# Patient Record
Sex: FEMALE | Race: WHITE | Employment: FULL TIME | URBAN - METROPOLITAN AREA
[De-identification: names, ages, dates, MRNs, and addresses within clinical notes are randomized per-mention and may not be internally consistent; named-entity substitution may affect disease eponyms.]

---

## 2018-03-29 ENCOUNTER — OFFICE VISIT (OUTPATIENT)
Dept: INFECTIOUS DISEASES | Age: 23
End: 2018-03-29

## 2018-03-29 VITALS
WEIGHT: 142 LBS | DIASTOLIC BLOOD PRESSURE: 66 MMHG | HEIGHT: 63 IN | TEMPERATURE: 98.4 F | BODY MASS INDEX: 25.16 KG/M2 | SYSTOLIC BLOOD PRESSURE: 104 MMHG | OXYGEN SATURATION: 99 % | HEART RATE: 94 BPM

## 2018-03-29 DIAGNOSIS — Z11.4 SCREENING FOR HIV (HUMAN IMMUNODEFICIENCY VIRUS): Primary | ICD-10-CM

## 2018-03-29 DIAGNOSIS — Z11.1 SCREENING-PULMONARY TB: ICD-10-CM

## 2018-03-29 DIAGNOSIS — Z70.8 SEXUALLY TRANSMITTED DISEASE COUNSELING: ICD-10-CM

## 2018-03-29 DIAGNOSIS — Z11.4 SCREENING FOR HIV (HUMAN IMMUNODEFICIENCY VIRUS): ICD-10-CM

## 2018-03-29 PROCEDURE — 99204 OFFICE O/P NEW MOD 45 MIN: CPT | Performed by: INTERNAL MEDICINE

## 2018-03-29 ASSESSMENT — ENCOUNTER SYMPTOMS
BACK PAIN: 0
COUGH: 0
HEMOPTYSIS: 0
EYE REDNESS: 0
BLURRED VISION: 0
DIARRHEA: 0
WHEEZING: 0
EYE DISCHARGE: 0
SPUTUM PRODUCTION: 0
SHORTNESS OF BREATH: 0
CONSTIPATION: 0
SORE THROAT: 0
NAUSEA: 0
ABDOMINAL PAIN: 0
DOUBLE VISION: 0

## 2018-03-29 NOTE — PROGRESS NOTES
Infectious Diseases Clinic New Patient Note      Reason for Visit:                Positive human immunodeficiency virus test  Primary Care Physician:  Joni Mcdonnell MD  History Obtained From:   Patient , Medical Records     CHIEF COMPLAINT:    Chief Complaint   Patient presents with    New Patient       HISTORY OF PRESENT ILLNESS: Bhavesh Pacheco is a 20 y.o. female patient, who was seen today in ID clinic at the request of Dr. Joni Mcdonnell MD    History was obtained from chart review and the patient. The patient was seen today for above mentioned complaints. The patient comes in today because she had a positive human immunodeficiency virus test done at [facility]. [facility] to get routine testing for STDs as she was starting a new relationship. She had human immunodeficiency virus 1 and 2 antibody screening done which was negative. That was reflexed to human immunodeficiency virus fourth-generation test that came back positive. The patient was referred to my clinic. She is asymptomatic. She is very stressed today about her new diagnosis. She also is asking that why her HIV 1 and 2 antibody were negative. She wants to establish care. Past Medical History:   Past medical  history was reviewed by me during this visit in detail. No past medical history on file. Past Surgical History:  Past surgical history was reviewed by me during this visit in detail. Past Surgical History:   Procedure Laterality Date    APPENDECTOMY  9/6/12    laparoscopic       Current Medications: All medications were reviewed by me during this visit  Current Outpatient Prescriptions   Medication Sig Dispense Refill    albuterol (PROAIR HFA) 108 (90 BASE) MCG/ACT inhaler Inhale 2 puffs into the lungs every 6 hours as needed for Shortness of Breath. Dispense with SPACER and Instruct on use 1 Inhaler 1     No current facility-administered medications for this visit.             Family

## 2018-03-30 LAB
A/G RATIO: 2 (ref 1.1–2.2)
ALBUMIN SERPL-MCNC: 5.3 G/DL (ref 3.4–5)
ALP BLD-CCNC: 61 U/L (ref 40–129)
ALT SERPL-CCNC: 20 U/L (ref 10–40)
ANION GAP SERPL CALCULATED.3IONS-SCNC: 16 MMOL/L (ref 3–16)
AST SERPL-CCNC: 18 U/L (ref 15–37)
BASOPHILS ABSOLUTE: 0 K/UL (ref 0–0.2)
BASOPHILS RELATIVE PERCENT: 0.5 %
BILIRUB SERPL-MCNC: 0.4 MG/DL (ref 0–1)
BUN BLDV-MCNC: 10 MG/DL (ref 7–20)
CALCIUM SERPL-MCNC: 9.4 MG/DL (ref 8.3–10.6)
CHLORIDE BLD-SCNC: 103 MMOL/L (ref 99–110)
CO2: 25 MMOL/L (ref 21–32)
CREAT SERPL-MCNC: 0.8 MG/DL (ref 0.6–1.1)
EOSINOPHILS ABSOLUTE: 0.1 K/UL (ref 0–0.6)
EOSINOPHILS RELATIVE PERCENT: 1.6 %
GFR AFRICAN AMERICAN: >60
GFR NON-AFRICAN AMERICAN: >60
GLOBULIN: 2.6 G/DL
GLUCOSE BLD-MCNC: 88 MG/DL (ref 70–99)
HCT VFR BLD CALC: 43.5 % (ref 36–48)
HEMOGLOBIN: 15.1 G/DL (ref 12–16)
LYMPHOCYTES ABSOLUTE: 2.2 K/UL (ref 1–5.1)
LYMPHOCYTES RELATIVE PERCENT: 31.4 %
MCH RBC QN AUTO: 30.6 PG (ref 26–34)
MCHC RBC AUTO-ENTMCNC: 34.8 G/DL (ref 31–36)
MCV RBC AUTO: 88.1 FL (ref 80–100)
MONOCYTES ABSOLUTE: 0.4 K/UL (ref 0–1.3)
MONOCYTES RELATIVE PERCENT: 5.8 %
NEUTROPHILS ABSOLUTE: 4.3 K/UL (ref 1.7–7.7)
NEUTROPHILS RELATIVE PERCENT: 60.7 %
PDW BLD-RTO: 12 % (ref 12.4–15.4)
PLATELET # BLD: 186 K/UL (ref 135–450)
PMV BLD AUTO: 10.2 FL (ref 5–10.5)
POTASSIUM SERPL-SCNC: 3.9 MMOL/L (ref 3.5–5.1)
RBC # BLD: 4.94 M/UL (ref 4–5.2)
SODIUM BLD-SCNC: 144 MMOL/L (ref 136–145)
TOTAL PROTEIN: 7.9 G/DL (ref 6.4–8.2)
WBC # BLD: 7 K/UL (ref 4–11)

## 2018-04-02 LAB
HIV RNA PCR INTERPRETATION: NOT DETECTED
HIV-1 RNA BY PCR, QN: <1.3 LOG
HIV-1 RNA BY PCR, QN: <20 CPY/ML
QUANTIFERON (R) TB GOLD (INCUBATED): NEGATIVE
QUANTIFERON MITOGEN: >10 IU/ML
QUANTIFERON NIL: 0.08 IU/ML
QUANTIFERON TB AG MINUS NIL: 0 IU/ML (ref 0–0.34)

## 2018-04-05 LAB
EER HIV-1 GENOTYPE BY SEQUENCE: NORMAL
HIV-1 GENOTYPE: NORMAL
HIV-1 INTEGRASE INHIBITOR RESISTANCE, SEQUENCE: NORMAL
HLA-B*5701 GENOTYPING: NEGATIVE
HLA-B*5701 SPECIMEN: NORMAL

## 2018-04-06 LAB
HIV AG/AB: REACTIVE
HIV ANTIGEN: REACTIVE
HIV-1 ANTIBODY: REACTIVE
HIV-2 AB: REACTIVE

## 2018-04-09 ENCOUNTER — TELEPHONE (OUTPATIENT)
Dept: INFECTIOUS DISEASES | Age: 23
End: 2018-04-09

## 2018-04-10 LAB
HIV 1/2 AB DIFF IMMUNOASSAY: NORMAL
HIV INTERPRETATION: NORMAL
HIV-1 ANTIBODY, SUPPLEMENTAL: NEGATIVE
HIV-2 ANTIBODY, SUPPLEMENTAL: NEGATIVE

## 2022-02-06 ENCOUNTER — HOSPITAL ENCOUNTER (EMERGENCY)
Age: 27
Discharge: HOME | End: 2022-02-06
Payer: COMMERCIAL

## 2022-02-06 VITALS
RESPIRATION RATE: 16 BRPM | SYSTOLIC BLOOD PRESSURE: 133 MMHG | HEART RATE: 97 BPM | OXYGEN SATURATION: 100 % | TEMPERATURE: 97.5 F | DIASTOLIC BLOOD PRESSURE: 87 MMHG

## 2022-02-06 DIAGNOSIS — S61.012A LACERATION OF LEFT THUMB WITHOUT FOREIGN BODY WITHOUT DAMAGE TO NAIL, INITIAL ENCOUNTER: ICD-10-CM

## 2022-02-06 PROCEDURE — 99283 EMERGENCY DEPT VISIT LOW MDM: CPT

## 2022-02-06 PROCEDURE — 12002 RPR S/N/AX/GEN/TRNK2.6-7.5CM: CPT

## 2022-02-06 ASSESSMENT — ENCOUNTER SYMPTOMS
VOMITING: 0
NAUSEA: 0

## 2022-02-06 ASSESSMENT — PAIN SCALES - GENERAL: PAINLEVEL_OUTOF10: 9

## 2022-02-06 NOTE — ED PROVIDER NOTES
[address] [facility]        Pt Name: Caron Aponte  MRN: [id number]  [place] 1995  Date of evaluation: 2/6/2022  Provider: Uyen Squires PA-C  PCP: Gretchen Pandya MD  Note Started: 2:59 AM EST       ROSA. I have evaluated this patient. My supervising physician was available for consultation. CHIEF COMPLAINT       Chief Complaint   Patient presents with    Laceration     pt was cutting vegetables and cut thumb with knife. HISTORY OF PRESENT ILLNESS   (Location, Timing/Onset, Context/Setting, Quality, Duration, Modifying Factors, Severity, Associated Signs and Symptoms)  Note limiting factors. Chief Complaint: Left thumb laceration    Caron Aponte is a 27 y.o. female who presents to the emergency department today for evaluation for a laceration to her left thumb which occurred shortly before arriving to the ED. The patient states that she was cutting up vegetables, with a new knife, which was straight, and she states that she accidentally cut her left thumb. The patient is unsure of her last tetanus. She is complaining of pain to where the laceration is only, and she is rating her discomfort as a 9/10. The patient is right-handed. She has no numbness, tingling or weakness. No fever chills. No nausea or vomiting. She is able to move the thumb without any difficulty. Nursing Notes were all reviewed and agreed with or any disagreements were addressed in the HPI. REVIEW OF SYSTEMS    (2-9 systems for level 4, 10 or more for level 5)     Review of Systems   Constitutional: Negative for activity change, appetite change, chills and fever. Gastrointestinal: Negative for nausea and vomiting. Musculoskeletal: Negative for arthralgias. Skin: Positive for wound. Neurological: Negative for weakness and numbness. Positives and Pertinent negatives as per HPI.  Except as noted above in the ROS, all other systems were reviewed and negative. PAST MEDICAL HISTORY   History reviewed. No pertinent past medical history. SURGICAL HISTORY     Past Surgical History:   Procedure Laterality Date    APPENDECTOMY  9/6/12    laparoscopic         CURRENTMEDICATIONS       Previous Medications    ALBUTEROL (PROAIR HFA) 108 (90 BASE) MCG/ACT INHALER    Inhale 2 puffs into the lungs every 6 hours as needed for Shortness of Breath. Dispense with SPACER and Instruct on use         ALLERGIES     Patient has no known allergies. FAMILYHISTORY     History reviewed. No pertinent family history. SOCIAL HISTORY       Social History     Tobacco Use    Smoking status: Never Smoker    Smokeless tobacco: Never Used   Substance Use Topics    Alcohol use: Yes     Comment: occ    Drug use: No       SCREENINGS             PHYSICAL EXAM    (up to 7 for level 4, 8 or more for level 5)     ED Triage Vitals [02/06/22 0231]   BP Temp Temp Source Pulse Resp SpO2 Height Weight   133/87 97.5 °F (36.4 °C) Oral 97 16 100 % -- --       Physical Exam  Vitals and nursing note reviewed. Constitutional:       Appearance: She is well-developed. She is not diaphoretic. HENT:      Head: Normocephalic and atraumatic. Right Ear: External ear normal.      Left Ear: External ear normal.      Nose: Nose normal.   Eyes:      General:         Right eye: No discharge. Left eye: No discharge. Neck:      Trachea: No tracheal deviation. Cardiovascular:      Pulses: Normal pulses. Pulmonary:      Effort: Pulmonary effort is normal. No respiratory distress. Musculoskeletal:         General: Normal range of motion. Cervical back: Normal range of motion and neck supple. Comments: To the base of the dorsum of the left first digit there is a 6 cm laceration, extending from the interdigital space, towards the dorsum of the hand towards the left thumb. The patient has full range of motion of all joints. Radial pulses 2+.   Normal sensation light touch. Neurovascularly intact. Skin:     General: Skin is warm and dry. Neurological:      Mental Status: She is alert and oriented to person, place, and time. Psychiatric:         Behavior: Behavior normal.         DIAGNOSTIC RESULTS   LABS:    Labs Reviewed - No data to display    When ordered only abnormal lab results are displayed. All other labs were within normal range or not returned as of this dictation. EKG: When ordered, EKG's are interpreted by the Emergency Department Physician in the absence of a cardiologist.  Please see their note for interpretation of EKG. RADIOLOGY:   Non-plain film images such as CT, Ultrasound and MRI are read by the radiologist. Plain radiographic images are visualized and preliminarily interpreted by the ED Provider with the below findings:        Interpretation per the Radiologist below, if available at the time of this note:    No orders to display     No results found. PROCEDURES   Unless otherwise noted below, none     Lac Repair    Date/Time: 2/6/2022 3:00 AM  Performed by: Reagan Cuenca PA-C  Authorized by: Reagan Cuenca PA-C     Consent:     Consent obtained:  Verbal    Consent given by:  Patient    Risks discussed:  Infection    Alternatives discussed:  No treatment  Anesthesia (see MAR for exact dosages): Anesthesia method:  Local infiltration    Local anesthetic:  Bupivacaine 0.5% w/o epi  Laceration details:     Location:  Finger    Finger location:  L thumb    Length (cm):  6    Depth (mm):  4  Repair type:     Repair type:  Simple  Pre-procedure details:     Preparation:  Patient was prepped and draped in usual sterile fashion  Treatment:     Area cleansed with:  Saline    Amount of cleaning:  Standard    Irrigation solution:  Sterile saline    Visualized foreign bodies/material removed: no    Skin repair:     Repair method:  Sutures    Suture size:  4-0    Wound skin closure material used: Ethilon.     Suture technique:  Simple interrupted    Number of sutures:  6  Approximation:     Approximation:  Close  Post-procedure details:     Dressing:  Open (no dressing)    Patient tolerance of procedure: Tolerated well, no immediate complications        CRITICAL CARE TIME       CONSULTS:  None      EMERGENCY DEPARTMENT COURSE and DIFFERENTIAL DIAGNOSIS/MDM:   Vitals:    Vitals:    02/06/22 0231   BP: 133/87   Pulse: 97   Resp: 16   Temp: 97.5 °F (36.4 °C)   TempSrc: Oral   SpO2: 100%       Patient was given the following medications:  Medications   Tetanus-Diphth-Acell Pertussis (BOOSTRIX) injection 0.5 mL (has no administration in time range)           Briefly, this is a 49-year-old female who presents to the emergency department today for evaluation for a left thumb laceration which occurred shortly before arriving to the ED. Examination she does have a laceration noted to the base of the left first digit which was repaired with 6 sutures by myself, please see procedure note above for additional details. She is otherwise neurovascularly intact is no bony tenderness I do not feel that she needs any imaging at this time. Her tetanus will be updated in the ED. Supportive care discussed at home including suture removal within the next week. She is to return to the ED for any new or worsening symptoms, the patient voiced understanding and is agreeable with plan. She is stable for discharge. No results found for this visit on 02/06/22. I estimate there is LOW risk for COMPARTMENT SYNDROME, DEEP VENOUS THROMBOSIS, SEPTIC ARTHRITIS, TENDON OR NEUROVASCULAR INJURY, thus I consider the discharge disposition reasonable. Yudi Garcia and I have discussed the diagnosis and risks, and we agree with discharging home to follow-up with their primary doctor or the referral orthopedist. We also discussed returning to the Emergency Department immediately if new or worsening symptoms occur.  We have discussed the symptoms which are most concerning (e.g., changing or worsening pain, numbness, weakness) that necessitate immediate return. Final Impression    1. Laceration of left thumb without foreign body without damage to nail, initial encounter        Blood pressure 133/87, pulse 97, temperature 97.5 °F (36.4 °C), temperature source Oral, resp. rate 16, SpO2 100 %. FINAL IMPRESSION      1.  Laceration of left thumb without foreign body without damage to nail, initial encounter          DISPOSITION/PLAN   DISPOSITION Discharge - Pending Orders Complete 02/06/2022 02:54:46 AM      PATIENT REFERRED TO:  [facility]) Pre-Services  [phone]  Schedule an appointment as soon as possible for a visit in 1 week  For suture removal    [facility] [place] Emergency Department  [address] [facility] [place]  [phone]    As needed, If symptoms worsen      DISCHARGE MEDICATIONS:  New Prescriptions    No medications on file       DISCONTINUED MEDICATIONS:  Discontinued Medications    No medications on file              (Please note that portions of this note were completed with a voice recognition program.  Efforts were made to edit the dictations but occasionally words are mis-transcribed.)    Caio Scott PA-C (electronically signed)            Caio Scott PA-C  02/06/22 [id number]